# Patient Record
Sex: MALE | HISPANIC OR LATINO | URBAN - METROPOLITAN AREA
[De-identification: names, ages, dates, MRNs, and addresses within clinical notes are randomized per-mention and may not be internally consistent; named-entity substitution may affect disease eponyms.]

---

## 2018-10-26 ENCOUNTER — OFFICE VISIT (OUTPATIENT)
Dept: URBAN - METROPOLITAN AREA CLINIC 62 | Facility: CLINIC | Age: 63
End: 2018-10-26
Payer: MEDICAID

## 2018-10-26 DIAGNOSIS — H04.123 DRY EYE SYNDROME OF BILATERAL LACRIMAL GLANDS: ICD-10-CM

## 2018-10-26 PROCEDURE — 92004 COMPRE OPH EXAM NEW PT 1/>: CPT | Performed by: OPTOMETRIST

## 2018-10-26 ASSESSMENT — VISUAL ACUITY
OD: 20/20
OS: 20/50

## 2018-10-26 ASSESSMENT — INTRAOCULAR PRESSURE
OD: 12
OS: 10

## 2018-10-26 ASSESSMENT — KERATOMETRY
OS: 45.00
OD: 44.13

## 2018-10-26 NOTE — IMPRESSION/PLAN
Impression: Age-related nuclear cataract, bilateral: H25.13. Plan: Cataracts account for the patient's complaints. Discussed all risks, benefits, procedures and recovery. Patient understands changing glasses will not improve vision. Patient desires to have surgery and refered to Dr. Damien Winchester for phacoemulsification with intraocular lens. Standard Distance aim, RL2. OD < OS.

## 2018-11-21 ENCOUNTER — OFFICE VISIT (OUTPATIENT)
Dept: URBAN - METROPOLITAN AREA CLINIC 62 | Facility: CLINIC | Age: 63
End: 2018-11-21
Payer: MEDICAID

## 2018-11-21 DIAGNOSIS — H25.813 COMBINED FORMS OF AGE-RELATED CATARACT, BILATERAL: Primary | ICD-10-CM

## 2018-11-21 PROCEDURE — 92014 COMPRE OPH EXAM EST PT 1/>: CPT | Performed by: OPHTHALMOLOGY

## 2018-11-21 RX ORDER — KETOROLAC TROMETHAMINE 5 MG/ML
0.5 % SOLUTION OPHTHALMIC
Qty: 1 | Refills: 1 | Status: INACTIVE
Start: 2018-11-21 | End: 2019-04-01

## 2018-11-21 RX ORDER — DIPHENHYDRAMINE HCL 25 MG
CAPSULE ORAL
Qty: 1 | Refills: 11 | Status: ACTIVE
Start: 2018-11-21

## 2018-11-21 RX ORDER — PREDNISOLONE ACETATE 10 MG/ML
1 % SUSPENSION/ DROPS OPHTHALMIC
Qty: 1 | Refills: 1 | Status: INACTIVE
Start: 2018-11-21 | End: 2019-04-01

## 2018-11-21 RX ORDER — GATIFLOXACIN 5 MG/ML
0.5 % SOLUTION/ DROPS OPHTHALMIC
Qty: 1 | Refills: 1 | Status: INACTIVE
Start: 2018-11-21 | End: 2018-11-21

## 2018-11-21 RX ORDER — OFLOXACIN 3 MG/ML
0.3 % SOLUTION/ DROPS OPHTHALMIC
Qty: 1 | Refills: 1 | Status: INACTIVE
Start: 2018-11-21 | End: 2019-04-01

## 2018-11-21 ASSESSMENT — KERATOMETRY
OS: 45.13
OD: 44.13

## 2018-11-21 ASSESSMENT — INTRAOCULAR PRESSURE
OS: 14
OD: 14

## 2018-11-21 ASSESSMENT — VISUAL ACUITY
OD: 20/30
OS: 20/50

## 2018-11-21 NOTE — IMPRESSION/PLAN
Impression: Combined forms of age-related cataract, bilateral: H25.813. Plan: Cataract accounts for pt complaints. Pt desires sx. Schedule CE/IOL left eye. Risk/Benefits/Alternatives discussed with patient. Rec. mono-focal. RL2. Target distance. Generic drops. Guarded prognosis due to corneal scar s/p pterygium sx. Pentacam (insurance) pre-op. Rec. Intra-ocular cefuroxime to decrease the risk of endophthalmitis.

## 2018-11-28 ENCOUNTER — TESTING ONLY (OUTPATIENT)
Dept: URBAN - METROPOLITAN AREA CLINIC 62 | Facility: CLINIC | Age: 63
End: 2018-11-28
Payer: MEDICAID

## 2018-11-28 DIAGNOSIS — H25.13 AGE-RELATED NUCLEAR CATARACT, BILATERAL: Primary | ICD-10-CM

## 2018-11-28 DIAGNOSIS — H17.9 UNSPECIFIED CORNEAL SCAR AND OPACITY: ICD-10-CM

## 2018-11-28 PROCEDURE — 92136 OPHTHALMIC BIOMETRY: CPT | Performed by: OPHTHALMOLOGY

## 2018-11-28 PROCEDURE — 92025 CPTRIZED CORNEAL TOPOGRAPHY: CPT | Performed by: OPHTHALMOLOGY

## 2018-12-03 ENCOUNTER — Encounter (OUTPATIENT)
Dept: URBAN - METROPOLITAN AREA SURGERY 39 | Facility: SURGERY | Age: 63
End: 2018-12-03
Payer: MEDICAID

## 2018-12-03 ENCOUNTER — SURGERY (OUTPATIENT)
Dept: URBAN - METROPOLITAN AREA SURGERY 40 | Facility: SURGERY | Age: 63
End: 2018-12-03
Payer: MEDICAID

## 2018-12-03 PROCEDURE — 66984 XCAPSL CTRC RMVL W/O ECP: CPT | Performed by: OPHTHALMOLOGY

## 2018-12-04 ENCOUNTER — POST-OPERATIVE VISIT (OUTPATIENT)
Dept: URBAN - METROPOLITAN AREA CLINIC 62 | Facility: CLINIC | Age: 63
End: 2018-12-04

## 2018-12-04 PROCEDURE — 99024 POSTOP FOLLOW-UP VISIT: CPT | Performed by: OPHTHALMOLOGY

## 2018-12-04 ASSESSMENT — INTRAOCULAR PRESSURE: OS: 27

## 2018-12-11 ENCOUNTER — POST-OPERATIVE VISIT (OUTPATIENT)
Dept: URBAN - METROPOLITAN AREA CLINIC 62 | Facility: CLINIC | Age: 63
End: 2018-12-11

## 2018-12-11 DIAGNOSIS — Z09 ENCNTR FOR F/U EXAM AFT TRTMT FOR COND OTH THAN MALIG NEOPLM: Primary | ICD-10-CM

## 2018-12-11 PROCEDURE — 99024 POSTOP FOLLOW-UP VISIT: CPT | Performed by: OPTOMETRIST

## 2018-12-11 ASSESSMENT — INTRAOCULAR PRESSURE: OS: 15

## 2018-12-31 ENCOUNTER — POST-OPERATIVE VISIT (OUTPATIENT)
Dept: URBAN - METROPOLITAN AREA CLINIC 62 | Facility: CLINIC | Age: 63
End: 2018-12-31

## 2018-12-31 PROCEDURE — 99024 POSTOP FOLLOW-UP VISIT: CPT | Performed by: OPTOMETRIST

## 2018-12-31 ASSESSMENT — INTRAOCULAR PRESSURE: OS: 18

## 2018-12-31 ASSESSMENT — VISUAL ACUITY: OS: 20/60

## 2019-04-01 ENCOUNTER — OFFICE VISIT (OUTPATIENT)
Dept: URBAN - METROPOLITAN AREA CLINIC 62 | Facility: CLINIC | Age: 64
End: 2019-04-01
Payer: MEDICAID

## 2019-04-01 DIAGNOSIS — H25.11 AGE-RELATED NUCLEAR CATARACT, RIGHT EYE: ICD-10-CM

## 2019-04-01 DIAGNOSIS — Z96.1 PRESENCE OF INTRAOCULAR LENS: Primary | ICD-10-CM

## 2019-04-01 PROCEDURE — 92014 COMPRE OPH EXAM EST PT 1/>: CPT | Performed by: OPTOMETRIST

## 2019-04-01 ASSESSMENT — INTRAOCULAR PRESSURE
OS: 16
OD: 16